# Patient Record
Sex: FEMALE | Race: WHITE | NOT HISPANIC OR LATINO | Employment: UNEMPLOYED | URBAN - METROPOLITAN AREA
[De-identification: names, ages, dates, MRNs, and addresses within clinical notes are randomized per-mention and may not be internally consistent; named-entity substitution may affect disease eponyms.]

---
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
## 2020-06-09 NOTE — DISCHARGE INSTRUCTIONS
Bipolar Disorder   WHAT YOU NEED TO KNOW:   Bipolar disorder is a long-term chemical imbalance that causes rapid changes in mood and behavior  High moods are called maya  Low moods are called depression  Sometimes you will feel manic and sometimes you will feel depressed  You can have alternating episodes of maya and depression  This is called a mixed bipolar state  DISCHARGE INSTRUCTIONS:   Call 911 if:   · You think about hurting yourself or someone else  Contact your healthcare provider or psychiatrist if:   · You are having trouble managing your bipolar disorder  · You cannot sleep, or are sleeping all the time  · You cannot eat, or are eating more than usual     · You feel dizzy or your stomach is upset  · You cannot make it to your next meeting  · You have questions or concerns about your condition or care  Medicines:   · Medicines  may be given to help keep your mood stable, or to help you sleep  Changes in medicine are often needed as your bipolar disorder changes  · Take your medicine as directed  Contact your healthcare provider if you think your medicine is not helping or if you have side effects  Tell him or her if you are allergic to any medicine  Keep a list of the medicines, vitamins, and herbs you take  Include the amounts, and when and why you take them  Bring the list or the pill bottles to follow-up visits  Carry your medicine list with you in case of an emergency  Follow up with your healthcare provider or psychiatrist as directed:  Write down your questions so you remember to ask them during your visits  Manage bipolar disorder:  Watch for triggers of bipolar disorder symptoms, such as stress  Learn new ways to relax, such as deep breathing, to manage your stress  Tell someone if you feel a manic or depressive period might be coming on  Ask a friend or family member to help watch you for bipolar symptoms   Work to develop skills that will help you manage bipolar Impression: Age-related nuclear cataract, bilateral: H25.13. Bilateral. Plan: Stable, observe. Pt will let us know when she is bothered by vision/glare. disorder  You may need to make lifestyle changes  Ask your healthcare provider or psychiatrist for resources  For support and more information:   · [address] [facility], [address] [facility], [address] [facility], [address]  [place] [address]   Phone: [phone]  Phone: [phone]  Web Address: Terrance hernandez  · Depression and [address] [facility]  [address] [place] [address], [facility] , [address]  Phone: [phone]  Web Address: [employer] no  org   © 2017 [address] [facility] Information is for End User's use only and may not be sold, redistributed or otherwise used for commercial purposes  All illustrations and images included in CareNotes® are the copyrighted property of A D A M , Inc  or [employer]  The above information is an  only  It is not intended as medical advice for individual conditions or treatments  Talk to your doctor, nurse or pharmacist before following any medical regimen to see if it is safe and effective for you  Oxcarbazepine (By mouth)   Oxcarbazepine ([id number]-ccy-VVJ-[id number]-peen)  Treats seizures  Brand Name(s): Oxtellar XR, Trileptal   There may be other brand names for this medicine  When This Medicine Should Not Be Used: This medicine is not right for everyone  Do not use it if you had an allergic reaction to oxcarbazepine  How to Use This Medicine:   Liquid, Tablet, Long Acting Tablet  · Take your medicine as directed  Your dose may need to be changed several times to find what works best for you  · This medicine should come with a Medication Guide  Ask your pharmacist for a copy if you do not have one  · Swallow the extended-release tablet whole  Do not crush, break, or chew it  · Oral liquid: Measure the oral liquid medicine with a marked measuring spoon, oral syringe, or medicine cup   Shake well just before you measure a dose  Swallow the medicine directly, or mix it in a glass with a small amount of water and drink all of the mixture right away  · Food:   ¨ Take the extended-release tablet on an empty stomach at least 1 hour before or 2 hours after a meal   ¨ You may take the oral liquid or regular tablet with or without food  · Missed dose: Take a dose as soon as you remember  If it is almost time for your next dose, wait until then and take a regular dose  Do not take extra medicine to make up for a missed dose  · Store the medicine in a closed container at room temperature, away from heat, moisture, and direct light  Store the oral liquid in the original container and use within 7 weeks after you first open the bottle  Throw away any unused liquid  Drugs and Foods to Avoid:   Ask your doctor or pharmacist before using any other medicine, including over-the-counter medicines, vitamins, and herbal products  · Some medicines and foods can affect how this drug works  Tell your doctor if you are also using any of the following:  ¨ Calcium channel blocker medicine, including felodipine or verapamil  ¨ Other medicine to control seizures  · Tell your doctor if you use anything else that makes you sleepy  Some examples are allergy medicine, narcotic pain medicine, and alcohol  Warnings While Using This Medicine:   · Tell your doctor if you are pregnant or breastfeeding, or if you have kidney disease or liver disease  Also tell your doctor if you are allergic to carbamazepine or tartrazine  · This medicine may cause the following problems:  ¨ Drug reaction with eosinophilia and systemic symptoms (DRESS), which may damage organs such as the liver, kidney, or heart  ¨ Low sodium levels in the blood  ¨ Serious skin or allergic reactions  ¨ Decreased levels of blood cells, which may cause bleeding problems or increase your risk for infection  · This medicine may cause depression, thoughts of suicide, or changes in mood or behavior  Tell your doctor if you have a history of depression or mental health problems  · Do not stop using this medicine suddenly  Your doctor will need to slowly decrease your dose before you stop it completely  The seizures might become more frequent if you suddenly stop using this medicine  · Birth control that uses hormones may not work as well while you are using this medicine  Use a different type of birth control if you need to  Talk with your doctor if you have questions  · This medicine may make you dizzy or drowsy  Do not drive or do anything else that could be dangerous until you know how this medicine affects you  · Your doctor will do lab tests at regular visits to check on the effects of this medicine  Keep all appointments  · Keep all medicine out of the reach of children  Never share your medicine with anyone  Possible Side Effects While Using This Medicine:   Call your doctor right away if you notice any of these side effects:  · Allergic reaction: Itching or hives, swelling in your face or hands, swelling or tingling in your mouth or throat, chest tightness, trouble breathing  · Blistering, peeling, red skin rash  · Confusion, weakness, muscle twitching  · Fever, skin rash, or swollen glands in your armpits, neck, or groin  · Unusual thoughts or behaviors, thoughts of hurting yourself, or feeling depressed, irritable, nervous, restless  · Unusual bleeding, bruising, or weakness  If you notice these less serious side effects, talk with your doctor:   · Dizziness, headache  · Nausea, vomiting, stomach pain  · Trouble concentrating or speaking, tiredness, clumsiness, trouble walking  · Vision changes, double vision  If you notice other side effects that you think are caused by this medicine, tell your doctor  Call your doctor for medical advice about side effects   You may report side effects to FDA at 9-701-FDA-0467  © 2017 [address] Information is for End User's use only and may not be sold, redistributed or otherwise used for commercial purposes  The above information is an  only  It is not intended as medical advice for individual conditions or treatments  Talk to your doctor, nurse or pharmacist before following any medical regimen to see if it is safe and effective for you

## 2020-08-04 ENCOUNTER — HOSPITAL ENCOUNTER (EMERGENCY)
Facility: HOSPITAL | Age: 20
Discharge: OTHER INSTITUTION | End: 2020-08-05
Attending: EMERGENCY MEDICINE
Payer: COMMERCIAL

## 2020-08-04 DIAGNOSIS — R45.851 SUICIDAL IDEATION: Primary | ICD-10-CM

## 2020-08-04 DIAGNOSIS — F31.4 BIPOLAR I DISORDER WITH DEPRESSION, SEVERE (HCC): Chronic | ICD-10-CM

## 2020-08-04 LAB
AMPHETAMINES SERPL QL SCN: NEGATIVE
BARBITURATES UR QL: NEGATIVE
BENZODIAZ UR QL: NEGATIVE
COCAINE UR QL: NEGATIVE
ETHANOL EXG-MCNC: 0 MG/DL
METHADONE UR QL: NEGATIVE
OPIATES UR QL SCN: NEGATIVE
OXYCODONE+OXYMORPHONE UR QL SCN: NEGATIVE
PCP UR QL: NEGATIVE
SARS-COV-2 RNA RESP QL NAA+PROBE: NEGATIVE
THC UR QL: POSITIVE

## 2020-08-04 PROCEDURE — 99285 EMERGENCY DEPT VISIT HI MDM: CPT | Performed by: EMERGENCY MEDICINE

## 2020-08-04 PROCEDURE — 99285 EMERGENCY DEPT VISIT HI MDM: CPT

## 2020-08-04 PROCEDURE — 87635 SARS-COV-2 COVID-19 AMP PRB: CPT | Performed by: EMERGENCY MEDICINE

## 2020-08-04 PROCEDURE — 82075 ASSAY OF BREATH ETHANOL: CPT | Performed by: EMERGENCY MEDICINE

## 2020-08-04 PROCEDURE — 80307 DRUG TEST PRSMV CHEM ANLYZR: CPT | Performed by: EMERGENCY MEDICINE

## 2020-08-04 PROCEDURE — 81025 URINE PREGNANCY TEST: CPT | Performed by: EMERGENCY MEDICINE

## 2020-08-04 NOTE — ED PROVIDER NOTES
History  Chief Complaint   Patient presents with    Suicidal     passive suicidal thoughts w/o plan x multiple months    Homicidal     "towards anyone who catches my eye" reports feeling angry all the time, reports no acts to harm anyone     20 yo who brings self to ED for evaluation by crisis  Feeling increasingly upset lately with vague SI without a plan  H/o SA last year  Triage notes HI but pt denies this at time of my eval and just notes increased irritability and agitation  No medical concerns  No ha or fever  No covid sxs or exposures  Prior to Admission Medications   Prescriptions Last Dose Informant Patient Reported? Taking?    Nebulizers (NEBULIZER COMPRESSOR) MISC   No No   Si Units by Does not apply route every 4 (four) hours as needed (dx asthma/wheezing) for up to 30 days   OXcarbazepine (TRILEPTAL) 600 mg tablet  Self No No   Sig: Take 1 tablet (600 mg total) by mouth 2 (two) times a day for 30 days   Testosterone 12 5 MG/ACT (1%) GEL   Yes No   Sig: Place 12 5 mg on the skin daily   albuterol (PROVENTIL HFA,VENTOLIN HFA) 90 mcg/act inhaler   Yes No   Sig: Inhale 2 puffs every 6 (six) hours as needed for wheezing or shortness of breath (anxiety)   paliperidone palmitate ER (INVEGA SUSTENNA) 156 mg/mL IM injection   Yes No   Sig: Inject 156 mg into a muscle every 30 (thirty) days       Facility-Administered Medications: None       Past Medical History:   Diagnosis Date    ADHD (attention deficit hyperactivity disorder)     Allergic     wellbutrin, rash from milk    Anxiety     Asthma     Borderline personality disorder ([facility] )     Depression     Eating disorder     pt describes causing self to vomit after meals until stomach was empty    Exercise-induced asthma     Extrinsic asthma     Eye trauma     First degree AV block     Gender dysphoria     Hallucination     Headache     Hemoptysis     LAST ASSESSED: 14    Hypercholesteremia     Psychiatric disorder     PTSD (post-traumatic stress disorder)     Pubertal menorrhagia     Seizures ([facility] )     Suicide attempt ([facility] ) 1/3/2017    Varicella        Past Surgical History:   Procedure Laterality Date    TONSILLECTOMY  2003    WITH ADENOIDECTOMY  Family History   Problem Relation Age of Onset    Mental illness Mother     Alcohol abuse Father     Drug abuse Father     Asthma Sister     Drug abuse Sister     Mental illness Sister     Allergic rhinitis Sister     Scoliosis Sister     Drug abuse Brother     ADD / ADHD Brother     Alcohol abuse Maternal Aunt     Alcohol abuse Maternal Uncle     Diabetes Maternal Uncle     Alcohol abuse Paternal Aunt     Alcohol abuse Paternal Uncle     Diabetes Maternal Grandmother     Cancer Family     Heart disease Family     Schizophrenia Family      I have reviewed and agree with the history as documented  E-Cigarette/Vaping     E-Cigarette/Vaping Substances     Social History     Tobacco Use    Smoking status: Current Every Day Smoker     Packs/day: 0 50     Years: 8 00     Pack years: 4 00     Types: Cigarettes    Smokeless tobacco: Never Used   Substance Use Topics    Alcohol use: Yes     Frequency: Monthly or less     Drinks per session: 1 or 2     Binge frequency: Never    Drug use: Yes     Types: Marijuana     Comment: 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, methadone, percocet, and oxycodone  Review of Systems   Psychiatric/Behavioral: Positive for self-injury and suicidal ideas  All other systems reviewed and are negative  Physical Exam  Physical Exam  Vitals signs and nursing note reviewed  Constitutional:       General: He is not in acute distress  Appearance: He is well-developed  He is not diaphoretic  HENT:      Head: Normocephalic and atraumatic  Eyes:      Conjunctiva/sclera: Conjunctivae normal       Pupils: Pupils are equal, round, and reactive to light  Neck:      Musculoskeletal: Normal range of motion and neck supple        Vascular: No JVD  Cardiovascular:      Rate and Rhythm: Normal rate and regular rhythm  Heart sounds: Normal heart sounds  No murmur  No friction rub  No gallop  Pulmonary:      Effort: Pulmonary effort is normal  No respiratory distress  Breath sounds: Normal breath sounds  No stridor  No wheezing or rales  Abdominal:      General: There is no distension  Palpations: Abdomen is soft  Tenderness: There is no abdominal tenderness  Musculoskeletal: Normal range of motion  General: No tenderness or deformity  Skin:     General: Skin is warm and dry  Capillary Refill: Capillary refill takes less than 2 seconds  Neurological:      Mental Status: He is alert and oriented to person, place, and time  Cranial Nerves: No cranial nerve deficit  Sensory: No sensory deficit  Motor: No abnormal muscle tone        Coordination: Coordination normal          Vital Signs  ED Triage Vitals [08/04/20 1758]   Temperature Pulse Respirations Blood Pressure SpO2   98 1 °F (36 7 °C) 89 20 130/60 98 %      Temp Source Heart Rate Source Patient Position - Orthostatic VS BP Location FiO2 (%)   Oral Monitor Sitting Right arm --      Pain Score       --           Vitals:    08/04/20 2200 08/05/20 0200 08/05/20 0558 08/05/20 1150   BP: 144/80 117/71 118/73 119/76   Pulse: 63 84 (!) 54 (!) 51   Patient Position - Orthostatic VS: Sitting Sitting Lying Sitting         Visual Acuity      ED Medications  Medications - No data to display    Diagnostic Studies  Results Reviewed     Procedure Component Value Units Date/Time    POCT pregnancy, urine [[id number]]  (Normal) Resulted:  08/05/20 0013    Lab Status:  Final result Updated:  08/05/20 0013     EXT PREG TEST UR (Ref: Negative) Negative     Control Valid    Rapid drug screen, urine [[id number]]  (Abnormal) Collected:  08/04/20 1906    Lab Status:  Final result Specimen:  Urine, Clean Catch Updated:  08/04/20 1922     Amph/Meth UR Negative Barbiturate Ur Negative     Benzodiazepine Urine Negative     Cocaine Urine Negative     Methadone Urine Negative     Opiate Urine Negative     PCP Ur Negative     THC Urine Positive     Oxycodone Urine Negative    Narrative:       Presumptive report  If requested, specimen will be sent to reference lab for confirmation  FOR MEDICAL PURPOSES ONLY  IF CONFIRMATION NEEDED PLEASE CONTACT THE LAB WITHIN 5 DAYS  Drug Screen Cutoff Levels:  AMPHETAMINE/METHAMPHETAMINES  1000 ng/mL  BARBITURATES     200 ng/mL  BENZODIAZEPINES     200 ng/mL  COCAINE      300 ng/mL  METHADONE      300 ng/mL  OPIATES      300 ng/mL  PHENCYCLIDINE     25 ng/mL  THC       50 ng/mL  OXYCODONE      100 ng/mL    Novel Coronavirus (Covid-19),PCR [facility] [[id number]]  (Normal) Collected:  08/04/20 1810    Lab Status:  Final result Specimen:  Nares from Nose Updated:  08/04/20 1906     SARS-CoV-2 Negative    Narrative: The specimen collection materials, transport medium, and/or testing methodology utilized in the production of these test results have been proven to be reliable in a limited validation with an abbreviated program under the Emergency Utilization Authorization provided by the FDA  Testing reported as "Presumptive positive" will be confirmed with secondary testing with a reference laboratory to ensure result accuracy  Clinical caution and judgement should be used with the interpretation of these results with consideration of the clinical impression and other laboratory testing  Testing reported as "Positive" or "Negative" has been proven to be accurate according to standard laboratory validation requirements  All testing is performed with control materials showing appropriate reactivity at standard intervals        POCT alcohol breath test [[id number]]  (Normal) Resulted:  08/04/20 1814    Lab Status:  Final result Updated:  08/04/20 1814     EXTBreath Alcohol 0 00                 No orders to display Procedures  Procedures         ED Course           CRAFFT      Most Recent Value   During the past 12 months, did you:   1  Drink any alcohol (more than a few sips)? No Filed at: 08/04/2020 1752   2  Smoke any marijuana or hashish  Yes Filed at: 08/04/2020 1752   3  Use anything else to get high? ("anything else" includes illegal drugs, over the counter and prescription drugs, and things that you sniff or 'lord')? No Filed at: 08/04/2020 1752   During the past 12 months:   1  Have you ever ridden in a car driven by someone (including yourself) who was "high" or had been using alcohol or drugs? 0 Filed at: 08/04/2020 1752   2  Do you ever use alcohol or drugs to relax, feel better about yourself, or fit in?  0 Filed at: 08/04/2020 1752   3  Do you ever use alcohol/drugs while you are by yourself, alone? 0 Filed at: 08/04/2020 1752   4  Do you ever forget things you did while using alcohol or drugs? 0 Filed at: 08/04/2020 1752   5  Do your family or friends ever tell you that you should cut down on your drinking or drug use? 0 Filed at: 08/04/2020 1752   6  Have you gotten into trouble while you were using alcohol or drugs? 0 Filed at: 08/04/2020 1752   CRAFFT Score  0 Filed at: 08/04/2020 1752                                        MDM  Number of Diagnoses or Management Options  Diagnosis management comments: Medically cleared for inpatient psychiatric evaluation           Disposition  Final diagnoses:   Suicidal ideation     Time reflects when diagnosis was documented in both MDM as applicable and the Disposition within this note     Time User Action Codes Description Comment    8/4/2020 10:41 PM Henrry Perales Add [R45 851] Suicidal ideation     8/5/2020 12:22 AM PinchuckMalathi Add [F31 4] Bipolar I disorder [facility] depression, severe ([facility] )     8/5/2020 12:22 AM Pinchuck, Isrrael Modify [F31 4] Bipolar I disorder with depression, severe ([facility] )     8/5/2020 12:22 AM Pinchuck Isrrael Remove [F31 4] Bipolar I disorder with depression, severe [facility]     8/5/2020 12:22 AM Angie Samano Add [F31 4] Bipolar I disorder with depression, severe ([facility] )     8/5/2020 12:22 AM Isrrael Samano Modify [F31 4] Bipolar I disorder with depression, severe [facility])       ED Disposition     ED Disposition Condition Date/Time Comment    Transfer to Another Facility-In Network  Tue Aug 4, 2020 10:41 PM Nitinrodrigo Cheung should be transferred out to CANDY Holloway MD Documentation      Most Recent Value   Patient Condition  The patient has been stabilized such that within reasonable medical probability, no material deterioration of the patient condition or the condition of the unborn child(bina) is likely to result from the transfer   Reason for Transfer  Level of Care needed not available at this facility   Benefits of Transfer  Other benefits (Include comment)_______________________ [facility]x (201)]   Risks of Transfer  Potential for delay in receiving treatment   Accepting Physician  Dr Jarred Dotson NameJerry  78    [facility] WITH [facility], [address]    (Name & Tel number)  JARETT Bautista   Transported by [employer] and Unit #)  Jo Richard   Provider Certification  The patient is stable for psychiatric transfer because they are medically stable, and is protected from harming him/herself or others during transport      RN Documentation      Most [address] Name, [address]   [facility] WITH [facility], [address]    (Name & Tel number)  JARETT Bautista   Report Given to  WHIT Olivier   Medications Reviewed with Next Provider of Service  Yes   Transport Mode  Car   Transported by [employer] and Unit #)  CTS   Level of Care  Other (Comment)   Copies of Medical Records Sent  History and Physical, Transfer form, Nursing note, Labs, Med Rec form, Progress note   Patient Belongings Disposition  Sent with patient   Transfer Date  08/05/20   Transfer Time  1210      Follow-up Information    None         Discharge Medication List as of 8/5/2020 12:11 PM      CONTINUE these medications which have NOT CHANGED    Details   albuterol (PROVENTIL HFA,VENTOLIN HFA) 90 mcg/act inhaler Inhale 2 puffs every 6 (six) hours as needed for wheezing or shortness of breath (anxiety), Until Discontinued, Historical Med      Testosterone 12 5 MG/ACT (1%) GEL Place 12 5 mg on the skin daily, Historical Med      Nebulizers (NEBULIZER COMPRESSOR) MISC 1 Units by Does not apply route every 4 (four) hours as needed (dx asthma/wheezing) for up to 30 days, Starting Sat 12/31/2016, Until Sun 7/28/2019, Print      OXcarbazepine (TRILEPTAL) 600 mg tablet Take 1 tablet (600 mg total) by mouth 2 (two) times a day for 30 days, Starting Mon 8/5/2019, Until Wed 9/4/2019, Print      paliperidone palmitate ER (INVEGA SUSTENNA) 156 mg/mL IM injection Inject 156 mg into a muscle every 30 (thirty) days , Historical Med           No discharge procedures on file      PDMP Review     None          ED Provider  Electronically Signed by           Mic Cannon MD  08/06/20 0039

## 2020-08-04 NOTE — LETTER
[address] [facility]  [address] [facility] [address]  Dept: [phone]                                                             EMTALA TRANSFER CONSENT    NAME Karina Martínez                    2000                   MRN [id number]    I have been informed of my rights regarding examination, treatment, and transfer   by Dr Abhishek Pedersen MD    Benefits: Other benefits (Include comment)_______________________(Inpatient Psych Tx (201))    Risks: Potential for delay in receiving treatment    Consent for Transfer:  I acknowledge that my medical condition has been evaluated and explained to me by the emergency department physician or other qualified medical person and/or my attending physician, who has recommended that I be transferred to the service of  Accepting Physician: Dr Josh Wells at [address] [facility] Name, [address] : [address]    [place] [address], [address]  The above potential benefits of such transfer, the potential risks associated with such transfer, and the probable risks of not being transferred have been explained to me, and I fully understand them  The doctor has explained that, in my case, the benefits of transfer outweigh the risks  I agree to be transferred  I authorize the performance of emergency medical procedures and treatments upon me in both transit and upon arrival at the receiving facility  Additionally, I authorize the release of any and all medical records to the receiving facility and request they be transported with me, if possible  I understand that the safest mode of transportation during a medical emergency is an ambulance and that the Hospital advocates the use of this mode of transport   Risks of traveling to the receiving facility by car, including absence of medical control, life sustaining equipment, such as oxygen, and medical personnel has been explained to me and I fully understand them     ([facility])  [  ]  I consent to the stated transfer and to be transported by ambulance/helicopter  [  ]  I consent to the stated transfer, but refuse transportation by ambulance and accept full responsibility for my transportation by car  I understand the risks of non-ambulance transfers and I exonerate the Hospital and its staff from any deterioration in my condition that results from this refusal     X___________________________________________    DATE  20  TIME________  Signature of patient or legally responsible individual signing on patient behalf           RELATIONSHIP TO PATIENT_________________________                      Provider Certification    NAME Kojo Driver                      2000                              MRN [id number]    A medical screening exam was performed on the above named patient  Based on the examination:    Condition Necessitating Transfer The primary encounter diagnosis was Suicidal ideation  A diagnosis of Bipolar I disorder with depression, severe ([facility] ) was also pertinent to this visit      Patient Condition: The patient has been stabilized such that within reasonable medical probability, no material deterioration of the patient condition or the condition of the unborn child(bina) is likely to result from the transfer    Reason for Transfer: Level of Care needed not available at this facility    Transfer Requirements: [address]    [facility] WITH [facility], [address] [facility] [address] [facility]   · Space available and qualified personnel available for treatment as acknowledged by JARETT Shipley  · Agreed to accept transfer and to provide appropriate medical treatment as acknowledged by       Dr Lona Patel  · Appropriate medical records of the examination and treatment of the patient are provided at the time of transfer   [address] ___JG____  · Transfer will be performed by qualified personnel from [facility]  and appropriate transfer equipment as required, including the use of necessary and appropriate life support measures  Provider Certification: I have examined the patient and explained the following risks and benefits of being transferred/refusing transfer to the patient/family:  The patient is stable for psychiatric transfer because they are medically stable, and is protected from harming him/herself or others during transport      Based on these reasonable risks and benefits to the patient and/or the unborn child(bina), and based upon the information available at the time of the patients examination, I certify that the medical benefits reasonably to be expected from the provision of appropriate medical treatments at another medical facility outweigh the increasing risks, if any, to the individuals medical condition, and in the case of labor to the unborn child, from effecting the transfer      X____________________________________________ DATE 08/05/20        TIME_______      ORIGINAL - SEND TO MEDICAL RECORDS   COPY - SEND WITH PATIENT DURING TRANSFER

## 2020-08-04 NOTE — ED NOTES
Per provider, pt does not need a 1:1 while pt's visitor (cousin) is at bedside        Galdino Jones  08/04/20 0047

## 2020-08-04 NOTE — ED NOTES
Cleared out room and pt is currently changing into paper scrubs        Julissa Maynard  08/04/20 9367

## 2020-08-05 ENCOUNTER — HOSPITAL ENCOUNTER (INPATIENT)
Facility: HOSPITAL | Age: 20
LOS: 12 days | Discharge: HOME | DRG: 876 | End: 2020-08-17
Attending: PSYCHIATRY & NEUROLOGY | Admitting: PSYCHIATRY & NEUROLOGY
Payer: COMMERCIAL

## 2020-08-05 VITALS
SYSTOLIC BLOOD PRESSURE: 119 MMHG | TEMPERATURE: 97.6 F | WEIGHT: 247.14 LBS | HEART RATE: 51 BPM | HEIGHT: 65 IN | DIASTOLIC BLOOD PRESSURE: 76 MMHG | BODY MASS INDEX: 41.18 KG/M2 | OXYGEN SATURATION: 98 % | RESPIRATION RATE: 16 BRPM

## 2020-08-05 DIAGNOSIS — F31.4 BIPOLAR I DISORDER WITH DEPRESSION, SEVERE (HCC): Primary | Chronic | ICD-10-CM

## 2020-08-05 DIAGNOSIS — S65.510D: ICD-10-CM

## 2020-08-05 PROBLEM — F43.10 PTSD (POST-TRAUMATIC STRESS DISORDER): Status: ACTIVE | Noted: 2019-05-09

## 2020-08-05 PROBLEM — Z00.8 MEDICAL CLEARANCE FOR PSYCHIATRIC ADMISSION: Status: ACTIVE | Noted: 2020-08-05

## 2020-08-05 PROBLEM — R41.82 ALTERED MENTAL STATUS: Status: RESOLVED | Noted: 2018-12-11 | Resolved: 2020-08-05

## 2020-08-05 PROBLEM — T14.91XA SUICIDE ATTEMPT (HCC): Status: RESOLVED | Noted: 2017-01-03 | Resolved: 2020-08-05

## 2020-08-05 PROBLEM — E66.01 MORBID OBESITY WITH BMI OF 40.0-44.9, ADULT (HCC): Chronic | Status: ACTIVE | Noted: 2020-08-05

## 2020-08-05 PROBLEM — S60.812A: Chronic | Status: RESOLVED | Noted: 2019-05-10 | Resolved: 2020-08-05

## 2020-08-05 PROBLEM — Z72.0 TOBACCO ABUSE: Chronic | Status: ACTIVE | Noted: 2020-08-05

## 2020-08-05 PROBLEM — T50.901A OVERDOSE: Status: RESOLVED | Noted: 2017-01-03 | Resolved: 2020-08-05

## 2020-08-05 PROBLEM — E87.5 HYPERKALEMIA: Status: RESOLVED | Noted: 2018-12-11 | Resolved: 2020-08-05

## 2020-08-05 PROBLEM — G40.909 SEIZURE DISORDER (HCC): Chronic | Status: ACTIVE | Noted: 2018-12-11

## 2020-08-05 PROBLEM — J45.20 MILD INTERMITTENT ASTHMA WITHOUT COMPLICATION: Chronic | Status: ACTIVE | Noted: 2020-08-05

## 2020-08-05 LAB
ALBUMIN SERPL BCP-MCNC: 4.4 G/DL (ref 3.5–5.7)
ALP SERPL-CCNC: 89 U/L (ref 40–150)
ALT SERPL W P-5'-P-CCNC: 32 U/L (ref 7–52)
ANION GAP SERPL CALCULATED.3IONS-SCNC: 8 MMOL/L (ref 4–13)
AST SERPL W P-5'-P-CCNC: 20 U/L (ref 13–39)
BASOPHILS # BLD AUTO: 0 THOUSANDS/ΜL (ref 0–0.1)
BASOPHILS NFR BLD AUTO: 0 % (ref 0–2)
BILIRUB SERPL-MCNC: 1 MG/DL (ref 0.2–1)
BILIRUB UR QL STRIP: ABNORMAL
BUN SERPL-MCNC: 12 MG/DL (ref 7–25)
CALCIUM SERPL-MCNC: 9.7 MG/DL (ref 8.6–10.5)
CHLORIDE SERPL-SCNC: 103 MMOL/L (ref 98–107)
CHOLEST SERPL-MCNC: 260 MG/DL (ref 0–200)
CLARITY UR: CLEAR
CO2 SERPL-SCNC: 28 MMOL/L (ref 21–31)
COLOR UR: YELLOW
CREAT SERPL-MCNC: 0.88 MG/DL (ref 0.7–1.2)
EOSINOPHIL # BLD AUTO: 0.4 THOUSAND/ΜL (ref 0–0.61)
EOSINOPHIL NFR BLD AUTO: 4 % (ref 0–5)
ERYTHROCYTE [DISTWIDTH] IN BLOOD BY AUTOMATED COUNT: 13.7 % (ref 11.5–14.5)
EXT PREG TEST URINE: NEGATIVE
EXT. CONTROL ED NAV: NORMAL
GLUCOSE P FAST SERPL-MCNC: 90 MG/DL (ref 65–99)
GLUCOSE SERPL-MCNC: 90 MG/DL (ref 65–99)
GLUCOSE UR STRIP-MCNC: NEGATIVE MG/DL
HCT VFR BLD AUTO: 46.7 % (ref 42–47)
HDLC SERPL-MCNC: 64 MG/DL
HGB BLD-MCNC: 16.4 G/DL (ref 14–16)
HGB UR QL STRIP.AUTO: NEGATIVE
KETONES UR STRIP-MCNC: ABNORMAL MG/DL
LDLC SERPL CALC-MCNC: 184 MG/DL (ref 0–100)
LEUKOCYTE ESTERASE UR QL STRIP: ABNORMAL
LYMPHOCYTES # BLD AUTO: 2.2 THOUSANDS/ΜL (ref 0.6–4.47)
LYMPHOCYTES NFR BLD AUTO: 26 % (ref 21–51)
MCH RBC QN AUTO: 31.2 PG (ref 26–34)
MCHC RBC AUTO-ENTMCNC: 35.2 G/DL (ref 31–37)
MCV RBC AUTO: 89 FL (ref 81–99)
MONOCYTES # BLD AUTO: 0.5 THOUSAND/ΜL (ref 0.17–1.22)
MONOCYTES NFR BLD AUTO: 6 % (ref 2–12)
NEUTROPHILS # BLD AUTO: 5.4 THOUSANDS/ΜL (ref 1.4–6.5)
NEUTS SEG NFR BLD AUTO: 64 % (ref 42–75)
NITRITE UR QL STRIP: NEGATIVE
NONHDLC SERPL-MCNC: 196 MG/DL
PH UR STRIP.AUTO: 6 [PH]
PLATELET # BLD AUTO: 185 THOUSANDS/UL (ref 149–390)
PMV BLD AUTO: 9 FL (ref 8.6–11.7)
POTASSIUM SERPL-SCNC: 4 MMOL/L (ref 3.5–5.5)
PROT SERPL-MCNC: 7.4 G/DL (ref 6.4–8.9)
PROT UR STRIP-MCNC: NEGATIVE MG/DL
RBC # BLD AUTO: 5.27 MILLION/UL (ref 4.3–5.2)
SODIUM SERPL-SCNC: 139 MMOL/L (ref 134–143)
SP GR UR STRIP.AUTO: 1.02 (ref 1–1.03)
TRIGL SERPL-MCNC: 60 MG/DL (ref 44–166)
TSH SERPL DL<=0.05 MIU/L-ACNC: 2.88 UIU/ML (ref 0.45–5.33)
UROBILINOGEN UR QL STRIP.AUTO: 1 E.U./DL
WBC # BLD AUTO: 8.5 THOUSAND/UL (ref 4.8–10.8)

## 2020-08-05 PROCEDURE — 99223 1ST HOSP IP/OBS HIGH 75: CPT | Performed by: PHYSICIAN ASSISTANT

## 2020-08-05 PROCEDURE — 85025 COMPLETE CBC W/AUTO DIFF WBC: CPT | Performed by: NURSE PRACTITIONER

## 2020-08-05 PROCEDURE — 81003 URINALYSIS AUTO W/O SCOPE: CPT | Performed by: NURSE PRACTITIONER

## 2020-08-05 PROCEDURE — 81001 URINALYSIS AUTO W/SCOPE: CPT | Performed by: NURSE PRACTITIONER

## 2020-08-05 PROCEDURE — 80061 LIPID PANEL: CPT | Performed by: NURSE PRACTITIONER

## 2020-08-05 PROCEDURE — 80053 COMPREHEN METABOLIC PANEL: CPT | Performed by: NURSE PRACTITIONER

## 2020-08-05 PROCEDURE — 84443 ASSAY THYROID STIM HORMONE: CPT | Performed by: NURSE PRACTITIONER

## 2020-08-05 PROCEDURE — 86592 SYPHILIS TEST NON-TREP QUAL: CPT | Performed by: NURSE PRACTITIONER

## 2020-08-05 RX ORDER — HALOPERIDOL 5 MG/ML
5 INJECTION INTRAMUSCULAR EVERY 6 HOURS PRN
Status: CANCELLED | OUTPATIENT
Start: 2020-08-05

## 2020-08-05 RX ORDER — PALIPERIDONE 6 MG/1
6 TABLET, EXTENDED RELEASE ORAL EVERY MORNING
COMMUNITY
End: 2020-08-17 | Stop reason: HOSPADM

## 2020-08-05 RX ORDER — BENZTROPINE MESYLATE 1 MG/1
2 TABLET ORAL EVERY 6 HOURS PRN
Status: DISCONTINUED | OUTPATIENT
Start: 2020-08-05 | End: 2020-08-05

## 2020-08-05 RX ORDER — ACETAMINOPHEN 325 MG/1
650 TABLET ORAL EVERY 4 HOURS PRN
Status: DISCONTINUED | OUTPATIENT
Start: 2020-08-05 | End: 2020-08-17 | Stop reason: HOSPADM

## 2020-08-05 RX ORDER — LORAZEPAM 1 MG/1
1 TABLET ORAL EVERY 8 HOURS PRN
Status: DISCONTINUED | OUTPATIENT
Start: 2020-08-05 | End: 2020-08-05

## 2020-08-05 RX ORDER — GABAPENTIN 300 MG/1
300 CAPSULE ORAL 3 TIMES DAILY
Status: DISCONTINUED | OUTPATIENT
Start: 2020-08-05 | End: 2020-08-07

## 2020-08-05 RX ORDER — LORAZEPAM 2 MG/ML
2 INJECTION INTRAMUSCULAR EVERY 6 HOURS PRN
Status: CANCELLED | OUTPATIENT
Start: 2020-08-05

## 2020-08-05 RX ORDER — BENZTROPINE MESYLATE 1 MG/1
1 TABLET ORAL EVERY 6 HOURS PRN
Status: DISCONTINUED | OUTPATIENT
Start: 2020-08-05 | End: 2020-08-17 | Stop reason: HOSPADM

## 2020-08-05 RX ORDER — LORAZEPAM 1 MG/1
1 TABLET ORAL EVERY 8 HOURS PRN
Status: CANCELLED | OUTPATIENT
Start: 2020-08-05

## 2020-08-05 RX ORDER — BENZTROPINE MESYLATE 1 MG/1
2 TABLET ORAL EVERY 6 HOURS PRN
Status: CANCELLED | OUTPATIENT
Start: 2020-08-05

## 2020-08-05 RX ORDER — NICOTINE 21 MG/24HR
1 PATCH, TRANSDERMAL 24 HOURS TRANSDERMAL DAILY
Status: CANCELLED | OUTPATIENT
Start: 2020-08-05

## 2020-08-05 RX ORDER — HALOPERIDOL 5 MG/ML
5 INJECTION INTRAMUSCULAR EVERY 6 HOURS PRN
Status: DISCONTINUED | OUTPATIENT
Start: 2020-08-05 | End: 2020-08-17 | Stop reason: HOSPADM

## 2020-08-05 RX ORDER — MAGNESIUM HYDROXIDE/ALUMINUM HYDROXICE/SIMETHICONE 120; 1200; 1200 MG/30ML; MG/30ML; MG/30ML
30 SUSPENSION ORAL EVERY 4 HOURS PRN
Status: DISCONTINUED | OUTPATIENT
Start: 2020-08-05 | End: 2020-08-17 | Stop reason: HOSPADM

## 2020-08-05 RX ORDER — ZIPRASIDONE HYDROCHLORIDE 20 MG/1
20 CAPSULE ORAL 2 TIMES DAILY WITH MEALS
Status: DISCONTINUED | OUTPATIENT
Start: 2020-08-05 | End: 2020-08-09

## 2020-08-05 RX ORDER — NICOTINE 21 MG/24HR
1 PATCH, TRANSDERMAL 24 HOURS TRANSDERMAL DAILY
Status: DISCONTINUED | OUTPATIENT
Start: 2020-08-05 | End: 2020-08-17 | Stop reason: HOSPADM

## 2020-08-05 RX ORDER — ACETAMINOPHEN 325 MG/1
975 TABLET ORAL EVERY 6 HOURS PRN
Status: DISCONTINUED | OUTPATIENT
Start: 2020-08-05 | End: 2020-08-17 | Stop reason: HOSPADM

## 2020-08-05 RX ORDER — HALOPERIDOL 5 MG
5 TABLET ORAL EVERY 8 HOURS PRN
Status: DISCONTINUED | OUTPATIENT
Start: 2020-08-05 | End: 2020-08-05

## 2020-08-05 RX ORDER — ACETAMINOPHEN 325 MG/1
650 TABLET ORAL EVERY 6 HOURS PRN
Status: CANCELLED | OUTPATIENT
Start: 2020-08-05

## 2020-08-05 RX ORDER — BENZTROPINE MESYLATE 1 MG/ML
2 INJECTION INTRAMUSCULAR; INTRAVENOUS EVERY 6 HOURS PRN
Status: CANCELLED | OUTPATIENT
Start: 2020-08-05

## 2020-08-05 RX ORDER — HALOPERIDOL 5 MG
5 TABLET ORAL EVERY 8 HOURS PRN
Status: CANCELLED | OUTPATIENT
Start: 2020-08-05

## 2020-08-05 RX ORDER — HALOPERIDOL 5 MG/ML
5 INJECTION INTRAMUSCULAR EVERY 6 HOURS PRN
Status: DISCONTINUED | OUTPATIENT
Start: 2020-08-05 | End: 2020-08-05

## 2020-08-05 RX ORDER — ACETAMINOPHEN 325 MG/1
650 TABLET ORAL EVERY 6 HOURS PRN
Status: DISCONTINUED | OUTPATIENT
Start: 2020-08-05 | End: 2020-08-17 | Stop reason: HOSPADM

## 2020-08-05 RX ORDER — MAGNESIUM HYDROXIDE/ALUMINUM HYDROXICE/SIMETHICONE 120; 1200; 1200 MG/30ML; MG/30ML; MG/30ML
30 SUSPENSION ORAL EVERY 4 HOURS PRN
Status: CANCELLED | OUTPATIENT
Start: 2020-08-05

## 2020-08-05 RX ORDER — HYDROXYZINE HYDROCHLORIDE 25 MG/1
50 TABLET, FILM COATED ORAL EVERY 4 HOURS PRN
Status: DISCONTINUED | OUTPATIENT
Start: 2020-08-05 | End: 2020-08-08

## 2020-08-05 RX ORDER — HALOPERIDOL 5 MG
5 TABLET ORAL EVERY 8 HOURS PRN
Status: DISCONTINUED | OUTPATIENT
Start: 2020-08-05 | End: 2020-08-17 | Stop reason: HOSPADM

## 2020-08-05 RX ORDER — LORAZEPAM 2 MG/ML
2 INJECTION INTRAMUSCULAR EVERY 6 HOURS PRN
Status: DISCONTINUED | OUTPATIENT
Start: 2020-08-05 | End: 2020-08-17 | Stop reason: HOSPADM

## 2020-08-05 RX ORDER — ALBUTEROL SULFATE 90 UG/1
2 AEROSOL, METERED RESPIRATORY (INHALATION) EVERY 6 HOURS PRN
Status: DISCONTINUED | OUTPATIENT
Start: 2020-08-05 | End: 2020-08-17 | Stop reason: HOSPADM

## 2020-08-05 RX ORDER — RISPERIDONE 1 MG/1
1 TABLET, ORALLY DISINTEGRATING ORAL EVERY 4 HOURS PRN
Status: DISCONTINUED | OUTPATIENT
Start: 2020-08-05 | End: 2020-08-17 | Stop reason: HOSPADM

## 2020-08-05 RX ORDER — IBUPROFEN 600 MG/1
600 TABLET ORAL EVERY 8 HOURS PRN
Status: DISCONTINUED | OUTPATIENT
Start: 2020-08-05 | End: 2020-08-05

## 2020-08-05 RX ORDER — TRAZODONE HYDROCHLORIDE 50 MG/1
50 TABLET ORAL
Status: DISCONTINUED | OUTPATIENT
Start: 2020-08-05 | End: 2020-08-17 | Stop reason: HOSPADM

## 2020-08-05 RX ORDER — IBUPROFEN 600 MG/1
600 TABLET ORAL EVERY 8 HOURS PRN
Status: CANCELLED | OUTPATIENT
Start: 2020-08-05

## 2020-08-05 RX ORDER — TRAZODONE HYDROCHLORIDE 50 MG/1
50 TABLET ORAL
Status: CANCELLED | OUTPATIENT
Start: 2020-08-05

## 2020-08-05 RX ORDER — OLANZAPINE 10 MG/1
10 INJECTION, POWDER, LYOPHILIZED, FOR SOLUTION INTRAMUSCULAR
Status: DISCONTINUED | OUTPATIENT
Start: 2020-08-05 | End: 2020-08-17 | Stop reason: HOSPADM

## 2020-08-05 RX ORDER — BENZTROPINE MESYLATE 1 MG/ML
1 INJECTION INTRAMUSCULAR; INTRAVENOUS EVERY 6 HOURS PRN
Status: DISCONTINUED | OUTPATIENT
Start: 2020-08-05 | End: 2020-08-17 | Stop reason: HOSPADM

## 2020-08-05 RX ORDER — BENZTROPINE MESYLATE 1 MG/ML
2 INJECTION INTRAMUSCULAR; INTRAVENOUS EVERY 6 HOURS PRN
Status: DISCONTINUED | OUTPATIENT
Start: 2020-08-05 | End: 2020-08-05

## 2020-08-05 RX ORDER — TESTOSTERONE 12.5 MG/1.25G
12.5 GEL TOPICAL DAILY
Status: DISCONTINUED | OUTPATIENT
Start: 2020-08-06 | End: 2020-08-17 | Stop reason: HOSPADM

## 2020-08-05 RX ORDER — HYDROXYZINE HYDROCHLORIDE 25 MG/1
25 TABLET, FILM COATED ORAL EVERY 6 HOURS PRN
Status: DISCONTINUED | OUTPATIENT
Start: 2020-08-05 | End: 2020-08-10

## 2020-08-05 RX ADMIN — GABAPENTIN 300 MG: 300 CAPSULE ORAL at 21:15

## 2020-08-05 RX ADMIN — ZIPRASIDONE HCL 20 MG: 20 CAPSULE ORAL at 17:23

## 2020-08-05 RX ADMIN — BENZTROPINE MESYLATE 1 MG: 1 TABLET ORAL at 22:30

## 2020-08-05 RX ADMIN — GABAPENTIN 300 MG: 300 CAPSULE ORAL at 17:23

## 2020-08-05 RX ADMIN — NICOTINE 1 PATCH: 21 PATCH, EXTENDED RELEASE TRANSDERMAL at 15:14

## 2020-08-05 RX ADMIN — HYDROXYZINE HYDROCHLORIDE 50 MG: 25 TABLET, FILM COATED ORAL at 22:30

## 2020-08-05 RX ADMIN — TRAZODONE HYDROCHLORIDE 50 MG: 50 TABLET ORAL at 21:15

## 2020-08-05 RX ADMIN — HALOPERIDOL 5 MG: 5 TABLET ORAL at 22:30

## 2020-08-05 NOTE — ED NOTES
Cell phone [balck]  Black hat  White socks  Black wallet  Keys  Black sweater  Black sweater pants  White and black footwear  Glasses  Phone   Elliott Irwin  08/04/20 1187
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
## 2020-08-05 NOTE — ED NOTES
Patient is accepted at [facility]  Patient is accepted by Dr Chu Wu per [employer]     Transportation is arranged with TBD  Transportation is scheduled for TBD  Patient may go to the floor at day shift 8/5  Nurse report is to be called to [phone] prior to patient transfer  AM crisis worker to contact Intake and Referral to coordinate transport time once [facility] is able to make a single room for Pt  Transport to be arranged tomorrow morning

## 2020-08-05 NOTE — ED NOTES
Both pt and Dr Mervat Winkler signed the 12 document  CW spoke with Katelynn Saldana of Intake who stated she will have a private bed available for pt @ -L tomorrow  CW faxed pt's 12 and face sheet to [facility] for review rosa Monzon, [facility], [address]  08/04/20 22:08

## 2020-08-05 NOTE — ED NOTES
Transportation is arranged with [facility] via [employer] [facility]  Transportation is scheduled for 12pm       Nurse report is to be called to [phone] prior to patient transfer      Asael Mcgee LMSW  08/05/20  1324

## 2020-08-05 NOTE — ED NOTES
Insurance Authorization for admission:   Phone call placed to [facility]  Phone number: [phone]  Spoke to Charu Zuñiga who provided pending reference # [id number]  Transferred to Magi CATES  3 days approved  Level of care: Inpatient  Review on 8/7  Authorization # [id number]  UR to call Robert to complete review @ [phone]  EVS (Eligibility Verification System) called - [phone]  Automated system indicates: eligible for MA  COB completed with Patric  Insurance Authorization for Transportation:    Not required for [facility]      Robert Tay LMSW  08/05/20  1038

## 2020-08-06 LAB
BACTERIA UR QL AUTO: ABNORMAL /HPF
HIV 1+2 AB+HIV1 P24 AG SERPL QL IA: NORMAL
HIV1 P24 AG SER QL: NORMAL
NON-SQ EPI CELLS URNS QL MICRO: ABNORMAL /HPF
RBC #/AREA URNS AUTO: ABNORMAL /HPF
RPR SER QL: NORMAL
WBC #/AREA URNS AUTO: ABNORMAL /HPF

## 2020-08-06 PROCEDURE — 87806 HIV AG W/HIV1&2 ANTB W/OPTIC: CPT | Performed by: NURSE PRACTITIONER

## 2020-08-06 PROCEDURE — 87340 HEPATITIS B SURFACE AG IA: CPT | Performed by: NURSE PRACTITIONER

## 2020-08-06 PROCEDURE — 99221 1ST HOSP IP/OBS SF/LOW 40: CPT | Performed by: PSYCHIATRY & NEUROLOGY

## 2020-08-06 PROCEDURE — 86803 HEPATITIS C AB TEST: CPT | Performed by: NURSE PRACTITIONER

## 2020-08-06 RX ADMIN — TRAZODONE HYDROCHLORIDE 50 MG: 50 TABLET ORAL at 21:09

## 2020-08-06 RX ADMIN — GABAPENTIN 300 MG: 300 CAPSULE ORAL at 16:22

## 2020-08-06 RX ADMIN — GABAPENTIN 300 MG: 300 CAPSULE ORAL at 08:19

## 2020-08-06 RX ADMIN — NICOTINE 1 PATCH: 21 PATCH, EXTENDED RELEASE TRANSDERMAL at 08:19

## 2020-08-06 RX ADMIN — ZIPRASIDONE HCL 20 MG: 20 CAPSULE ORAL at 16:22

## 2020-08-06 RX ADMIN — ALUMINUM HYDROXIDE, MAGNESIUM HYDROXIDE, AND SIMETHICONE 30 ML: 200; 200; 20 SUSPENSION ORAL at 21:55

## 2020-08-06 RX ADMIN — ZIPRASIDONE HCL 20 MG: 20 CAPSULE ORAL at 08:19

## 2020-08-06 RX ADMIN — GABAPENTIN 300 MG: 300 CAPSULE ORAL at 21:09

## 2020-08-07 ENCOUNTER — APPOINTMENT (INPATIENT)
Dept: RADIOLOGY | Facility: HOSPITAL | Age: 20
DRG: 876 | End: 2020-08-07
Payer: COMMERCIAL

## 2020-08-07 PROBLEM — S65.510A: Status: ACTIVE | Noted: 2020-08-07

## 2020-08-07 LAB
HBV SURFACE AG SER QL: NORMAL
HCV AB SER QL: NORMAL

## 2020-08-07 PROCEDURE — 0XQNXZZ REPAIR RIGHT INDEX FINGER, EXTERNAL APPROACH: ICD-10-PCS | Performed by: PSYCHIATRY & NEUROLOGY

## 2020-08-07 PROCEDURE — 99232 SBSQ HOSP IP/OBS MODERATE 35: CPT | Performed by: PSYCHIATRY & NEUROLOGY

## 2020-08-07 PROCEDURE — 12002 RPR S/N/AX/GEN/TRNK2.6-7.5CM: CPT | Performed by: PHYSICIAN ASSISTANT

## 2020-08-07 PROCEDURE — 90715 TDAP VACCINE 7 YRS/> IM: CPT | Performed by: HOSPITALIST

## 2020-08-07 PROCEDURE — 99233 SBSQ HOSP IP/OBS HIGH 50: CPT | Performed by: HOSPITALIST

## 2020-08-07 PROCEDURE — 73130 X-RAY EXAM OF HAND: CPT

## 2020-08-07 RX ORDER — GABAPENTIN 400 MG/1
400 CAPSULE ORAL 2 TIMES DAILY
Status: DISCONTINUED | OUTPATIENT
Start: 2020-08-07 | End: 2020-08-10

## 2020-08-07 RX ORDER — BACITRACIN, NEOMYCIN, POLYMYXIN B 400; 3.5; 5 [USP'U]/G; MG/G; [USP'U]/G
1 OINTMENT TOPICAL 2 TIMES DAILY
Status: DISCONTINUED | OUTPATIENT
Start: 2020-08-07 | End: 2020-08-17 | Stop reason: HOSPADM

## 2020-08-07 RX ORDER — LIDOCAINE HYDROCHLORIDE 20 MG/ML
5 INJECTION, SOLUTION EPIDURAL; INFILTRATION; INTRACAUDAL; PERINEURAL ONCE
Status: COMPLETED | OUTPATIENT
Start: 2020-08-07 | End: 2020-08-07

## 2020-08-07 RX ADMIN — ZIPRASIDONE HCL 20 MG: 20 CAPSULE ORAL at 08:47

## 2020-08-07 RX ADMIN — TETANUS TOXOID, REDUCED DIPHTHERIA TOXOID AND ACELLULAR PERTUSSIS VACCINE, ADSORBED 0.5 ML: 5; 2.5; 8; 8; 2.5 SUSPENSION INTRAMUSCULAR at 18:31

## 2020-08-07 RX ADMIN — LORAZEPAM 2 MG: 2 INJECTION INTRAMUSCULAR; INTRAVENOUS at 18:09

## 2020-08-07 RX ADMIN — BENZTROPINE MESYLATE 1 MG: 1 INJECTION INTRAMUSCULAR; INTRAVENOUS at 18:09

## 2020-08-07 RX ADMIN — GABAPENTIN 300 MG: 300 CAPSULE ORAL at 08:47

## 2020-08-07 RX ADMIN — ZIPRASIDONE HCL 20 MG: 20 CAPSULE ORAL at 17:00

## 2020-08-07 RX ADMIN — NICOTINE 1 PATCH: 21 PATCH, EXTENDED RELEASE TRANSDERMAL at 08:45

## 2020-08-07 RX ADMIN — HALOPERIDOL LACTATE 5 MG: 5 INJECTION, SOLUTION INTRAMUSCULAR at 18:09

## 2020-08-07 RX ADMIN — GABAPENTIN 400 MG: 400 CAPSULE ORAL at 17:00

## 2020-08-07 RX ADMIN — LIDOCAINE HYDROCHLORIDE 5 ML: 20 INJECTION, SOLUTION EPIDURAL; INFILTRATION; INTRACAUDAL; PERINEURAL at 18:31

## 2020-08-07 RX ADMIN — BACITRACIN, NEOMYCIN, POLYMYXIN B 1 SMALL APPLICATION: 400; 3.5; 5 OINTMENT TOPICAL at 19:00

## 2020-08-08 PROBLEM — Z00.8 MEDICAL CLEARANCE FOR PSYCHIATRIC ADMISSION: Status: RESOLVED | Noted: 2020-08-05 | Resolved: 2020-08-08

## 2020-08-08 LAB
BASOPHILS # BLD AUTO: 0 THOUSANDS/ΜL (ref 0–0.1)
BASOPHILS NFR BLD AUTO: 1 % (ref 0–2)
EOSINOPHIL # BLD AUTO: 0.3 THOUSAND/ΜL (ref 0–0.61)
EOSINOPHIL NFR BLD AUTO: 4 % (ref 0–5)
ERYTHROCYTE [DISTWIDTH] IN BLOOD BY AUTOMATED COUNT: 13.6 % (ref 11.5–14.5)
HCT VFR BLD AUTO: 43.7 % (ref 42–47)
HGB BLD-MCNC: 15.4 G/DL (ref 14–16)
LYMPHOCYTES # BLD AUTO: 3 THOUSANDS/ΜL (ref 0.6–4.47)
LYMPHOCYTES NFR BLD AUTO: 42 % (ref 21–51)
MCH RBC QN AUTO: 30.9 PG (ref 26–34)
MCHC RBC AUTO-ENTMCNC: 35.2 G/DL (ref 31–37)
MCV RBC AUTO: 88 FL (ref 81–99)
MONOCYTES # BLD AUTO: 0.7 THOUSAND/ΜL (ref 0.17–1.22)
MONOCYTES NFR BLD AUTO: 10 % (ref 2–12)
NEUTROPHILS # BLD AUTO: 3.1 THOUSANDS/ΜL (ref 1.4–6.5)
NEUTS SEG NFR BLD AUTO: 44 % (ref 42–75)
PLATELET # BLD AUTO: 181 THOUSANDS/UL (ref 149–390)
PMV BLD AUTO: 8.9 FL (ref 8.6–11.7)
RBC # BLD AUTO: 4.98 MILLION/UL (ref 4.3–5.2)
WBC # BLD AUTO: 7.1 THOUSAND/UL (ref 4.8–10.8)

## 2020-08-08 PROCEDURE — NC001 PR NO CHARGE: Performed by: PSYCHIATRY & NEUROLOGY

## 2020-08-08 PROCEDURE — 99233 SBSQ HOSP IP/OBS HIGH 50: CPT | Performed by: NURSE PRACTITIONER

## 2020-08-08 PROCEDURE — 85025 COMPLETE CBC W/AUTO DIFF WBC: CPT | Performed by: HOSPITALIST

## 2020-08-08 RX ORDER — LORAZEPAM 0.5 MG/1
0.5 TABLET ORAL EVERY 6 HOURS PRN
Status: DISCONTINUED | OUTPATIENT
Start: 2020-08-08 | End: 2020-08-17 | Stop reason: HOSPADM

## 2020-08-08 RX ADMIN — ZIPRASIDONE HCL 20 MG: 20 CAPSULE ORAL at 15:41

## 2020-08-08 RX ADMIN — GABAPENTIN 400 MG: 400 CAPSULE ORAL at 08:34

## 2020-08-08 RX ADMIN — ZIPRASIDONE HCL 20 MG: 20 CAPSULE ORAL at 08:30

## 2020-08-08 RX ADMIN — HYDROXYZINE HYDROCHLORIDE 50 MG: 25 TABLET, FILM COATED ORAL at 11:55

## 2020-08-08 RX ADMIN — TRAZODONE HYDROCHLORIDE 50 MG: 50 TABLET ORAL at 22:07

## 2020-08-08 RX ADMIN — BACITRACIN, NEOMYCIN, POLYMYXIN B 1 SMALL APPLICATION: 400; 3.5; 5 OINTMENT TOPICAL at 08:34

## 2020-08-08 RX ADMIN — ACETAMINOPHEN 650 MG: 325 TABLET ORAL at 05:02

## 2020-08-08 RX ADMIN — GABAPENTIN 400 MG: 400 CAPSULE ORAL at 17:03

## 2020-08-08 RX ADMIN — TESTOSTERONE 12.5 MG: 12.5 GEL TOPICAL at 09:30

## 2020-08-08 RX ADMIN — NICOTINE 1 PATCH: 21 PATCH, EXTENDED RELEASE TRANSDERMAL at 08:37

## 2020-08-09 PROCEDURE — 99232 SBSQ HOSP IP/OBS MODERATE 35: CPT | Performed by: NURSE PRACTITIONER

## 2020-08-09 RX ORDER — ZIPRASIDONE HYDROCHLORIDE 40 MG/1
40 CAPSULE ORAL 2 TIMES DAILY WITH MEALS
Status: DISCONTINUED | OUTPATIENT
Start: 2020-08-09 | End: 2020-08-11

## 2020-08-09 RX ADMIN — HALOPERIDOL 5 MG: 5 TABLET ORAL at 11:43

## 2020-08-09 RX ADMIN — BENZTROPINE MESYLATE 1 MG: 1 TABLET ORAL at 11:43

## 2020-08-09 RX ADMIN — BACITRACIN, NEOMYCIN, POLYMYXIN B 1 SMALL APPLICATION: 400; 3.5; 5 OINTMENT TOPICAL at 17:02

## 2020-08-09 RX ADMIN — GABAPENTIN 400 MG: 400 CAPSULE ORAL at 17:02

## 2020-08-09 RX ADMIN — LORAZEPAM 0.5 MG: 0.5 TABLET ORAL at 11:43

## 2020-08-09 RX ADMIN — ZIPRASIDONE HCL 20 MG: 20 CAPSULE ORAL at 07:54

## 2020-08-09 RX ADMIN — NICOTINE 1 PATCH: 21 PATCH, EXTENDED RELEASE TRANSDERMAL at 08:05

## 2020-08-09 RX ADMIN — BACITRACIN, NEOMYCIN, POLYMYXIN B 1 SMALL APPLICATION: 400; 3.5; 5 OINTMENT TOPICAL at 08:04

## 2020-08-09 RX ADMIN — ZIPRASIDONE HYDROCHLORIDE 40 MG: 40 CAPSULE ORAL at 16:55

## 2020-08-09 RX ADMIN — TESTOSTERONE 12.5 MG: 12.5 GEL TOPICAL at 08:10

## 2020-08-09 RX ADMIN — GABAPENTIN 400 MG: 400 CAPSULE ORAL at 08:04

## 2020-08-10 PROCEDURE — 99232 SBSQ HOSP IP/OBS MODERATE 35: CPT | Performed by: PSYCHIATRY & NEUROLOGY

## 2020-08-10 RX ORDER — CLONAZEPAM 0.5 MG/1
0.5 TABLET ORAL
Status: DISCONTINUED | OUTPATIENT
Start: 2020-08-10 | End: 2020-08-11

## 2020-08-10 RX ORDER — BUTALBITAL, ACETAMINOPHEN AND CAFFEINE 50; 325; 40 MG/1; MG/1; MG/1
1 TABLET ORAL EVERY 4 HOURS PRN
Status: DISCONTINUED | OUTPATIENT
Start: 2020-08-10 | End: 2020-08-17 | Stop reason: HOSPADM

## 2020-08-10 RX ORDER — GABAPENTIN 300 MG/1
600 CAPSULE ORAL 2 TIMES DAILY
Status: DISCONTINUED | OUTPATIENT
Start: 2020-08-10 | End: 2020-08-11

## 2020-08-10 RX ADMIN — TESTOSTERONE 12.5 MG: 12.5 GEL TOPICAL at 08:57

## 2020-08-10 RX ADMIN — ACETAMINOPHEN 975 MG: 325 TABLET ORAL at 10:33

## 2020-08-10 RX ADMIN — GABAPENTIN 600 MG: 300 CAPSULE ORAL at 17:16

## 2020-08-10 RX ADMIN — ACETAMINOPHEN 650 MG: 325 TABLET ORAL at 04:40

## 2020-08-10 RX ADMIN — ZIPRASIDONE HYDROCHLORIDE 40 MG: 40 CAPSULE ORAL at 17:16

## 2020-08-10 RX ADMIN — LORAZEPAM 0.5 MG: 0.5 TABLET ORAL at 17:48

## 2020-08-10 RX ADMIN — NICOTINE 1 PATCH: 21 PATCH, EXTENDED RELEASE TRANSDERMAL at 08:58

## 2020-08-10 RX ADMIN — GABAPENTIN 400 MG: 400 CAPSULE ORAL at 08:57

## 2020-08-10 RX ADMIN — BACITRACIN, NEOMYCIN, POLYMYXIN B 1 SMALL APPLICATION: 400; 3.5; 5 OINTMENT TOPICAL at 08:57

## 2020-08-10 RX ADMIN — TRAZODONE HYDROCHLORIDE 50 MG: 50 TABLET ORAL at 21:03

## 2020-08-10 RX ADMIN — CLONAZEPAM 0.5 MG: 0.5 TABLET ORAL at 21:03

## 2020-08-10 RX ADMIN — ACETAMINOPHEN 650 MG: 325 TABLET ORAL at 02:50

## 2020-08-10 RX ADMIN — HALOPERIDOL 5 MG: 5 TABLET ORAL at 17:48

## 2020-08-10 RX ADMIN — ZIPRASIDONE HYDROCHLORIDE 40 MG: 40 CAPSULE ORAL at 08:30

## 2020-08-10 RX ADMIN — BENZTROPINE MESYLATE 1 MG: 1 TABLET ORAL at 17:48

## 2020-08-11 PROCEDURE — 99232 SBSQ HOSP IP/OBS MODERATE 35: CPT | Performed by: PSYCHIATRY & NEUROLOGY

## 2020-08-11 RX ORDER — CLONAZEPAM 1 MG/1
1 TABLET ORAL
Status: DISCONTINUED | OUTPATIENT
Start: 2020-08-11 | End: 2020-08-17 | Stop reason: HOSPADM

## 2020-08-11 RX ORDER — CLONAZEPAM 0.5 MG/1
0.5 TABLET ORAL EVERY MORNING
Status: DISCONTINUED | OUTPATIENT
Start: 2020-08-12 | End: 2020-08-17 | Stop reason: HOSPADM

## 2020-08-11 RX ORDER — GABAPENTIN 400 MG/1
800 CAPSULE ORAL 2 TIMES DAILY
Status: DISCONTINUED | OUTPATIENT
Start: 2020-08-11 | End: 2020-08-17 | Stop reason: HOSPADM

## 2020-08-11 RX ORDER — PALIPERIDONE 3 MG/1
3 TABLET, EXTENDED RELEASE ORAL DAILY
Status: DISCONTINUED | OUTPATIENT
Start: 2020-08-11 | End: 2020-08-12

## 2020-08-11 RX ADMIN — GABAPENTIN 800 MG: 400 CAPSULE ORAL at 17:40

## 2020-08-11 RX ADMIN — BACITRACIN, NEOMYCIN, POLYMYXIN B 1 SMALL APPLICATION: 400; 3.5; 5 OINTMENT TOPICAL at 17:40

## 2020-08-11 RX ADMIN — LORAZEPAM 0.5 MG: 0.5 TABLET ORAL at 10:54

## 2020-08-11 RX ADMIN — GABAPENTIN 600 MG: 300 CAPSULE ORAL at 08:11

## 2020-08-11 RX ADMIN — TESTOSTERONE 12.5 MG: 12.5 GEL TOPICAL at 08:14

## 2020-08-11 RX ADMIN — HALOPERIDOL LACTATE 5 MG: 5 INJECTION, SOLUTION INTRAMUSCULAR at 14:14

## 2020-08-11 RX ADMIN — RISPERIDONE 1 MG: 1 TABLET, ORALLY DISINTEGRATING ORAL at 20:16

## 2020-08-11 RX ADMIN — ZIPRASIDONE HYDROCHLORIDE 40 MG: 40 CAPSULE ORAL at 08:11

## 2020-08-11 RX ADMIN — PALIPERIDONE 3 MG: 3 TABLET, EXTENDED RELEASE ORAL at 12:25

## 2020-08-11 RX ADMIN — LORAZEPAM 2 MG: 2 INJECTION INTRAMUSCULAR; INTRAVENOUS at 14:14

## 2020-08-11 RX ADMIN — CLONAZEPAM 1 MG: 1 TABLET ORAL at 21:36

## 2020-08-11 RX ADMIN — BACITRACIN, NEOMYCIN, POLYMYXIN B 1 SMALL APPLICATION: 400; 3.5; 5 OINTMENT TOPICAL at 08:11

## 2020-08-11 RX ADMIN — BENZTROPINE MESYLATE 1 MG: 1 INJECTION INTRAMUSCULAR; INTRAVENOUS at 14:13

## 2020-08-11 RX ADMIN — NICOTINE 1 PATCH: 21 PATCH, EXTENDED RELEASE TRANSDERMAL at 08:11

## 2020-08-12 PROCEDURE — 84402 ASSAY OF FREE TESTOSTERONE: CPT | Performed by: PSYCHIATRY & NEUROLOGY

## 2020-08-12 PROCEDURE — 99233 SBSQ HOSP IP/OBS HIGH 50: CPT | Performed by: PSYCHIATRY & NEUROLOGY

## 2020-08-12 PROCEDURE — 84403 ASSAY OF TOTAL TESTOSTERONE: CPT | Performed by: PSYCHIATRY & NEUROLOGY

## 2020-08-12 RX ORDER — PALIPERIDONE 6 MG/1
6 TABLET, EXTENDED RELEASE ORAL DAILY
Status: DISCONTINUED | OUTPATIENT
Start: 2020-08-13 | End: 2020-08-15

## 2020-08-12 RX ADMIN — LORAZEPAM 0.5 MG: 0.5 TABLET ORAL at 15:59

## 2020-08-12 RX ADMIN — HALOPERIDOL 5 MG: 5 TABLET ORAL at 15:59

## 2020-08-12 RX ADMIN — GABAPENTIN 800 MG: 400 CAPSULE ORAL at 08:00

## 2020-08-12 RX ADMIN — CLONAZEPAM 1 MG: 1 TABLET ORAL at 21:00

## 2020-08-12 RX ADMIN — GABAPENTIN 800 MG: 400 CAPSULE ORAL at 17:33

## 2020-08-12 RX ADMIN — BACITRACIN, NEOMYCIN, POLYMYXIN B 1 SMALL APPLICATION: 400; 3.5; 5 OINTMENT TOPICAL at 17:33

## 2020-08-12 RX ADMIN — BACITRACIN, NEOMYCIN, POLYMYXIN B 1 SMALL APPLICATION: 400; 3.5; 5 OINTMENT TOPICAL at 08:00

## 2020-08-12 RX ADMIN — BUTALBITAL, ACETAMINOPHEN, AND CAFFEINE 1 TABLET: 50; 325; 40 TABLET ORAL at 20:38

## 2020-08-12 RX ADMIN — NICOTINE 1 PATCH: 21 PATCH, EXTENDED RELEASE TRANSDERMAL at 08:00

## 2020-08-12 RX ADMIN — CLONAZEPAM 0.5 MG: 0.5 TABLET ORAL at 08:00

## 2020-08-12 RX ADMIN — BENZTROPINE MESYLATE 1 MG: 1 TABLET ORAL at 15:59

## 2020-08-12 RX ADMIN — PALIPERIDONE 3 MG: 3 TABLET, EXTENDED RELEASE ORAL at 08:00

## 2020-08-12 RX ADMIN — TESTOSTERONE 12.5 MG: 12.5 GEL TOPICAL at 08:01

## 2020-08-13 LAB
TESTOST FREE SERPL-MCNC: 23.4 PG/ML (ref 0–4.2)
TESTOST SERPL-MCNC: 1500 NG/DL (ref 8–48)

## 2020-08-13 PROCEDURE — 99233 SBSQ HOSP IP/OBS HIGH 50: CPT | Performed by: PSYCHIATRY & NEUROLOGY

## 2020-08-13 RX ADMIN — GABAPENTIN 800 MG: 400 CAPSULE ORAL at 07:22

## 2020-08-13 RX ADMIN — BACITRACIN, NEOMYCIN, POLYMYXIN B 1 SMALL APPLICATION: 400; 3.5; 5 OINTMENT TOPICAL at 07:23

## 2020-08-13 RX ADMIN — BACITRACIN, NEOMYCIN, POLYMYXIN B 1 SMALL APPLICATION: 400; 3.5; 5 OINTMENT TOPICAL at 18:32

## 2020-08-13 RX ADMIN — NICOTINE 1 PATCH: 21 PATCH, EXTENDED RELEASE TRANSDERMAL at 09:06

## 2020-08-13 RX ADMIN — TRAZODONE HYDROCHLORIDE 50 MG: 50 TABLET ORAL at 21:19

## 2020-08-13 RX ADMIN — CLONAZEPAM 1 MG: 1 TABLET ORAL at 21:19

## 2020-08-13 RX ADMIN — CLONAZEPAM 0.5 MG: 0.5 TABLET ORAL at 07:22

## 2020-08-13 RX ADMIN — LORAZEPAM 0.5 MG: 0.5 TABLET ORAL at 11:40

## 2020-08-13 RX ADMIN — HALOPERIDOL 5 MG: 5 TABLET ORAL at 11:40

## 2020-08-13 RX ADMIN — PALIPERIDONE 6 MG: 6 TABLET, EXTENDED RELEASE ORAL at 07:23

## 2020-08-13 RX ADMIN — TESTOSTERONE 12.5 MG: 12.5 GEL TOPICAL at 11:23

## 2020-08-13 RX ADMIN — GABAPENTIN 800 MG: 400 CAPSULE ORAL at 17:29

## 2020-08-13 RX ADMIN — BENZTROPINE MESYLATE 1 MG: 1 TABLET ORAL at 11:40

## 2020-08-14 PROCEDURE — 99232 SBSQ HOSP IP/OBS MODERATE 35: CPT | Performed by: NURSE PRACTITIONER

## 2020-08-14 RX ADMIN — TRAZODONE HYDROCHLORIDE 50 MG: 50 TABLET ORAL at 21:32

## 2020-08-14 RX ADMIN — CLONAZEPAM 0.5 MG: 0.5 TABLET ORAL at 08:51

## 2020-08-14 RX ADMIN — BENZTROPINE MESYLATE 1 MG: 1 TABLET ORAL at 12:30

## 2020-08-14 RX ADMIN — PALIPERIDONE 6 MG: 6 TABLET, EXTENDED RELEASE ORAL at 08:50

## 2020-08-14 RX ADMIN — GABAPENTIN 800 MG: 400 CAPSULE ORAL at 08:50

## 2020-08-14 RX ADMIN — NICOTINE 1 PATCH: 21 PATCH, EXTENDED RELEASE TRANSDERMAL at 08:50

## 2020-08-14 RX ADMIN — RISPERIDONE 1 MG: 1 TABLET, ORALLY DISINTEGRATING ORAL at 18:57

## 2020-08-14 RX ADMIN — BACITRACIN, NEOMYCIN, POLYMYXIN B 1 SMALL APPLICATION: 400; 3.5; 5 OINTMENT TOPICAL at 08:51

## 2020-08-14 RX ADMIN — CLONAZEPAM 1 MG: 1 TABLET ORAL at 21:32

## 2020-08-14 RX ADMIN — HALOPERIDOL 5 MG: 5 TABLET ORAL at 12:30

## 2020-08-14 RX ADMIN — TESTOSTERONE 12.5 MG: 12.5 GEL TOPICAL at 09:10

## 2020-08-14 RX ADMIN — GABAPENTIN 800 MG: 400 CAPSULE ORAL at 17:05

## 2020-08-14 RX ADMIN — BACITRACIN, NEOMYCIN, POLYMYXIN B 1 SMALL APPLICATION: 400; 3.5; 5 OINTMENT TOPICAL at 17:05

## 2020-08-14 RX ADMIN — LORAZEPAM 0.5 MG: 0.5 TABLET ORAL at 12:30

## 2020-08-15 PROCEDURE — 99232 SBSQ HOSP IP/OBS MODERATE 35: CPT | Performed by: PSYCHIATRY & NEUROLOGY

## 2020-08-15 RX ADMIN — TESTOSTERONE 12.5 MG: 12.5 GEL TOPICAL at 08:42

## 2020-08-15 RX ADMIN — BENZTROPINE MESYLATE 1 MG: 1 TABLET ORAL at 17:19

## 2020-08-15 RX ADMIN — GABAPENTIN 800 MG: 400 CAPSULE ORAL at 17:02

## 2020-08-15 RX ADMIN — CLONAZEPAM 0.5 MG: 0.5 TABLET ORAL at 08:37

## 2020-08-15 RX ADMIN — TRAZODONE HYDROCHLORIDE 50 MG: 50 TABLET ORAL at 21:10

## 2020-08-15 RX ADMIN — PALIPERIDONE 6 MG: 6 TABLET, EXTENDED RELEASE ORAL at 08:36

## 2020-08-15 RX ADMIN — BACITRACIN, NEOMYCIN, POLYMYXIN B 1 SMALL APPLICATION: 400; 3.5; 5 OINTMENT TOPICAL at 17:03

## 2020-08-15 RX ADMIN — BACITRACIN, NEOMYCIN, POLYMYXIN B 1 SMALL APPLICATION: 400; 3.5; 5 OINTMENT TOPICAL at 09:30

## 2020-08-15 RX ADMIN — GABAPENTIN 800 MG: 400 CAPSULE ORAL at 08:37

## 2020-08-15 RX ADMIN — BENZTROPINE MESYLATE 1 MG: 1 TABLET ORAL at 11:27

## 2020-08-15 RX ADMIN — RISPERIDONE 1 MG: 1 TABLET, ORALLY DISINTEGRATING ORAL at 17:19

## 2020-08-15 RX ADMIN — LORAZEPAM 0.5 MG: 0.5 TABLET ORAL at 17:19

## 2020-08-15 RX ADMIN — CLONAZEPAM 1 MG: 1 TABLET ORAL at 21:10

## 2020-08-15 RX ADMIN — LORAZEPAM 0.5 MG: 0.5 TABLET ORAL at 11:27

## 2020-08-15 RX ADMIN — HALOPERIDOL 5 MG: 5 TABLET ORAL at 11:28

## 2020-08-15 RX ADMIN — NICOTINE 1 PATCH: 21 PATCH, EXTENDED RELEASE TRANSDERMAL at 08:38

## 2020-08-16 PROCEDURE — 99231 SBSQ HOSP IP/OBS SF/LOW 25: CPT | Performed by: PSYCHIATRY & NEUROLOGY

## 2020-08-16 RX ADMIN — CLONAZEPAM 1 MG: 1 TABLET ORAL at 21:26

## 2020-08-16 RX ADMIN — PALIPERIDONE 9 MG: 6 TABLET, EXTENDED RELEASE ORAL at 08:29

## 2020-08-16 RX ADMIN — HALOPERIDOL 5 MG: 5 TABLET ORAL at 20:00

## 2020-08-16 RX ADMIN — GABAPENTIN 800 MG: 400 CAPSULE ORAL at 08:29

## 2020-08-16 RX ADMIN — BENZTROPINE MESYLATE 1 MG: 1 TABLET ORAL at 20:00

## 2020-08-16 RX ADMIN — BACITRACIN, NEOMYCIN, POLYMYXIN B 1 SMALL APPLICATION: 400; 3.5; 5 OINTMENT TOPICAL at 17:20

## 2020-08-16 RX ADMIN — CLONAZEPAM 0.5 MG: 0.5 TABLET ORAL at 08:29

## 2020-08-16 RX ADMIN — LORAZEPAM 0.5 MG: 0.5 TABLET ORAL at 20:00

## 2020-08-16 RX ADMIN — NICOTINE 1 PATCH: 21 PATCH, EXTENDED RELEASE TRANSDERMAL at 08:34

## 2020-08-16 RX ADMIN — TRAZODONE HYDROCHLORIDE 50 MG: 50 TABLET ORAL at 21:26

## 2020-08-16 RX ADMIN — TESTOSTERONE 12.5 MG: 12.5 GEL TOPICAL at 08:35

## 2020-08-16 RX ADMIN — BACITRACIN, NEOMYCIN, POLYMYXIN B 1 SMALL APPLICATION: 400; 3.5; 5 OINTMENT TOPICAL at 08:29

## 2020-08-16 RX ADMIN — GABAPENTIN 800 MG: 400 CAPSULE ORAL at 17:00

## 2020-08-17 VITALS
RESPIRATION RATE: 16 BRPM | WEIGHT: 241 LBS | TEMPERATURE: 97.7 F | SYSTOLIC BLOOD PRESSURE: 111 MMHG | BODY MASS INDEX: 40.15 KG/M2 | OXYGEN SATURATION: 98 % | HEART RATE: 69 BPM | HEIGHT: 65 IN | DIASTOLIC BLOOD PRESSURE: 58 MMHG

## 2020-08-17 PROCEDURE — NC001 PR NO CHARGE: Performed by: PHYSICIAN ASSISTANT

## 2020-08-17 PROCEDURE — 99238 HOSP IP/OBS DSCHRG MGMT 30/<: CPT | Performed by: PSYCHIATRY & NEUROLOGY

## 2020-08-17 RX ORDER — CLONAZEPAM 0.5 MG/1
0.5 TABLET ORAL EVERY MORNING
Qty: 10 TABLET | Refills: 0 | Status: SHIPPED | OUTPATIENT
Start: 2020-08-18 | End: 2020-08-28

## 2020-08-17 RX ORDER — PALIPERIDONE 9 MG/1
9 TABLET, EXTENDED RELEASE ORAL DAILY
Qty: 15 TABLET | Refills: 1 | Status: SHIPPED | OUTPATIENT
Start: 2020-08-18 | End: 2020-09-02

## 2020-08-17 RX ORDER — CLONAZEPAM 1 MG/1
1 TABLET ORAL
Qty: 15 TABLET | Refills: 1 | Status: SHIPPED | OUTPATIENT
Start: 2020-08-17 | End: 2020-09-01

## 2020-08-17 RX ORDER — GABAPENTIN 400 MG/1
800 CAPSULE ORAL 2 TIMES DAILY
Qty: 60 CAPSULE | Refills: 1 | Status: SHIPPED | OUTPATIENT
Start: 2020-08-17 | End: 2020-09-01

## 2020-08-17 RX ADMIN — TESTOSTERONE 12.5 MG: 12.5 GEL TOPICAL at 08:46

## 2020-08-17 RX ADMIN — BACITRACIN, NEOMYCIN, POLYMYXIN B 1 SMALL APPLICATION: 400; 3.5; 5 OINTMENT TOPICAL at 08:44

## 2020-08-17 RX ADMIN — CLONAZEPAM 0.5 MG: 0.5 TABLET ORAL at 08:44

## 2020-08-17 RX ADMIN — PALIPERIDONE 9 MG: 6 TABLET, EXTENDED RELEASE ORAL at 08:44

## 2020-08-17 RX ADMIN — GABAPENTIN 800 MG: 400 CAPSULE ORAL at 08:45

## 2020-08-17 RX ADMIN — NICOTINE 1 PATCH: 21 PATCH, EXTENDED RELEASE TRANSDERMAL at 08:48

## 2023-02-10 NOTE — DISCHARGE INSTRUCTIONS
Adult Overdose   WHAT YOU NEED TO KNOW:   An overdose occurs when you take more medicine than is safe to take  An overdose may be mild, or it may be a life-threatening emergency  You may feel drowsy, dizzy, or nauseated, depending on what medicine you took  No specific harm was found to your body as a result of your overdose  Your symptoms have decreased over the last 6 to 12 hours  DISCHARGE INSTRUCTIONS:   Call 911 if you or someone close to you has any of the following symptoms:   · Your face is very pale and clammy to the touch  · Your body is limp or you are unable to speak  · You cannot be awakened  · Your breathing is slower or faster than usual      · Your heart is beating slower than usual     · You feel confused or more tired than usual, or you are sweating more than normal     · Your speech is slurred  · Your fingernails or lips are blue or purple  Return to the emergency department if:   · You have severe nausea and vomiting  · You cannot have a bowel movement or urinate  · Your skin and the whites of your eyes turn yellow  Contact your healthcare provider if:   · You think your medicine is not working  · You have nausea, vomiting, diarrhea, or abdominal cramps  · You have questions or concerns about your medicine  Take your medicine as directed:  Contact your healthcare provider if you think your medicine is not helping or if you have side effects  Do not take more medicine that is prescribed  Keep your medicines in the original containers  Keep a list of the medicines, vitamins, and herbs you take  Include the amounts, and when and why you take them  Do not share your medicine with others  Prevent another overdose:   · Read labels carefully  Read the labels of all the medicines that you take  Never take more than the label says to take  If you have questions, ask your pharmacist or healthcare provider  · Do not drink alcohol    Alcohol increases your risk for another overdose  Alcohol can also hide important symptoms that you need to call your healthcare provider for  · Do not drive or operate machinery  until your healthcare provider says it is okay  These activities may be dangerous after an overdose  · Use caution if you take more than one medicine at a time  Mixing medicines or taking more than one medicine at a time can be dangerous  · Tell your family or friends what medicines you are taking  Talk with them about what to do if you have an overdose  Follow up with your healthcare provider as directed: You may need to see a counselor or psychiatrist  Write down your questions so you remember to ask them during your visits  © 2017 [facility] [facility] Information is for End User's use only and may not be sold, redistributed or otherwise used for commercial purposes  All illustrations and images included in CareNotes® are the copyrighted property of [employer]  or [employer]  The above information is an  only  It is not intended as medical advice for individual conditions or treatments  Talk to your doctor, nurse or pharmacist before following any medical regimen to see if it is safe and effective for you  MAR

## 2025-01-22 NOTE — TELEPHONE ENCOUNTER
Please advise on letter once records are reviewed Report given to Keyana PERSAUD [address] [place]. Pt admitting to [phone]